# Patient Record
Sex: MALE | Race: WHITE | Employment: FULL TIME | URBAN - METROPOLITAN AREA
[De-identification: names, ages, dates, MRNs, and addresses within clinical notes are randomized per-mention and may not be internally consistent; named-entity substitution may affect disease eponyms.]

---

## 2020-07-01 ENCOUNTER — HOSPITAL ENCOUNTER (OUTPATIENT)
Age: 30
Discharge: HOME | End: 2020-07-03

## 2020-07-01 PROCEDURE — 86787 VARICELLA-ZOSTER ANTIBODY: CPT

## 2020-07-02 LAB — VARICELLA-ZOSTER VIRUS AB, IGG: NORMAL

## 2020-09-05 ENCOUNTER — EMPLOYEE WELLNESS (OUTPATIENT)
Dept: OTHER | Age: 30
End: 2020-09-05

## 2020-09-05 LAB
CHOLESTEROL, TOTAL: 123 MG/DL (ref 0–199)
GLUCOSE BLD-MCNC: 82 MG/DL (ref 74–107)
HDLC SERPL-MCNC: 32 MG/DL
LDL CHOLESTEROL CALCULATED: 80 MG/DL (ref 0–99)
TRIGL SERPL-MCNC: 57 MG/DL (ref 0–149)

## 2020-10-19 VITALS — BODY MASS INDEX: 29.26 KG/M2 | WEIGHT: 191 LBS

## 2020-12-28 ENCOUNTER — HOSPITAL ENCOUNTER (OUTPATIENT)
Dept: GENERAL RADIOLOGY | Age: 30
Discharge: HOME | End: 2020-12-30
Payer: COMMERCIAL

## 2020-12-28 ENCOUNTER — HOSPITAL ENCOUNTER (OUTPATIENT)
Age: 30
Discharge: HOME | End: 2020-12-30
Payer: COMMERCIAL

## 2020-12-28 PROCEDURE — 71101 X-RAY EXAM UNILAT RIBS/CHEST: CPT

## 2021-02-02 ENCOUNTER — OFFICE VISIT (OUTPATIENT)
Dept: CHIROPRACTIC MEDICINE | Age: 31
End: 2021-02-02
Payer: COMMERCIAL

## 2021-02-02 ENCOUNTER — TELEPHONE (OUTPATIENT)
Dept: ADMINISTRATIVE | Age: 31
End: 2021-02-02

## 2021-02-02 VITALS
TEMPERATURE: 98 F | SYSTOLIC BLOOD PRESSURE: 112 MMHG | DIASTOLIC BLOOD PRESSURE: 78 MMHG | HEIGHT: 67 IN | BODY MASS INDEX: 28.25 KG/M2 | RESPIRATION RATE: 14 BRPM | HEART RATE: 66 BPM | OXYGEN SATURATION: 97 % | WEIGHT: 180 LBS

## 2021-02-02 DIAGNOSIS — S16.1XXA STRAIN OF NECK MUSCLE, INITIAL ENCOUNTER: Primary | ICD-10-CM

## 2021-02-02 DIAGNOSIS — M54.12 CERVICAL NEURALGIA: ICD-10-CM

## 2021-02-02 DIAGNOSIS — M62.830 MUSCLE SPASM OF BACK: ICD-10-CM

## 2021-02-02 PROCEDURE — 97014 ELECTRIC STIMULATION THERAPY: CPT | Performed by: CHIROPRACTOR

## 2021-02-02 PROCEDURE — 99203 OFFICE O/P NEW LOW 30 MIN: CPT | Performed by: CHIROPRACTOR

## 2021-02-02 PROCEDURE — 98940 CHIROPRACT MANJ 1-2 REGIONS: CPT | Performed by: CHIROPRACTOR

## 2021-02-02 RX ORDER — ESCITALOPRAM OXALATE 10 MG/1
TABLET ORAL
COMMUNITY
Start: 2021-01-13

## 2021-02-02 NOTE — TELEPHONE ENCOUNTER
Pt called and requested to schedule as a new patient. He said he has had neck pain for a long time but over the past 3 days it has gotten worse. He is having neck and back pain with numbness in his left hand. Was unsure if you would want pt scheduled today or when you would want him scheduled. Please advise.

## 2021-02-02 NOTE — PROGRESS NOTES
[facility]    21  Fior Hart : 1990 Sex: male  Age: 31 y.o. Patient was referred by Ignacio Zamudio MD    Chief Complaint   Patient presents with    Neck Pain     pain and stiffness; is having numbness into the left hand. Difficulty moving head. No known injury        He states he awoke a few days ago with left greater than right neck pain identified in the cervicothoracic region. He really has not done much about it, other than take some ibuprofen this morning. He does have some symptoms down the left medial arm, with numbness and tingling in the fingertips digits 1-4. Neck Pain   This is a new problem. The current episode started in the past 7 days. The problem occurs constantly. The problem has been unchanged. The pain is associated with a sleep position. Pain location: C/T region. The pain is at a severity of 5/10 (5-7/10). The symptoms are aggravated by bending and twisting. Pertinent negatives include no fever. Associated symptoms comments: Down left arm, triceps distribution, with numbness digits 1-4 finger tips  . He has tried NSAIDs for the symptoms. The treatment provided mild relief. Neck disability index: 24/50= 48%  Red Flags:  none    Review of Systems   Constitutional: Negative for chills and fever. Musculoskeletal: Positive for neck pain and neck stiffness. Current Outpatient Medications:     escitalopram (LEXAPRO) 10 MG tablet, , Disp: , Rfl:     No Known Allergies    Past Medical History:   Diagnosis Date    Syncope      No family history on file. No past surgical history on file.   Social History     Socioeconomic History    Marital status:      Spouse name: Not on file    Number of children: Not on file    Years of education: Not on file    Highest education level: Not on file   Occupational History    Not on file   Social Needs    Financial resource strain: Not on file    Food insecurity     Worry: Not on file Cervical Compression: Reproduces axial neck pain left C6-T1 facets  Cervical Distraction: Negative  Foraminal Compression: Positive left, negative right  Maximum Cervical Rotatory Compression Testing: Positive left, negative right    No midline pain in the cervical spine. Hypertonic tender fibers cervical and thoracic paraspinal muscles. Trigger points bilateral trapezius. There is joint fixation motion screening today at: T3-5    Tashia Boss was seen today for neck pain. Diagnoses and all orders for this visit:    Strain of neck muscle, initial encounter    Cervical neuralgia    Muscle spasm of back        Treatment Plan:   I spoke with him regarding my exam findings and treatment options. Cervical capsulitis with referral pattern. Based on examination, does not appear to be true radiculopathy. However I will continue to monitor and consider imaging if he fails to respond. I recommended that we start a trial of conservative care today consisting EMS, manipulation/mobilization and home-based self-care. I will plan on seeing him 1-2 times per week for 3 weeks, then reassess to determine response to care and future options. With consent, I did begin today. Problem/Goals  Decrease pain and/or swelling and Increase ROM and/or flexibility    Today's Treatment  EMS with heat to the cervicothoracic region for 14 minutes to address muscle spasm/hypertension and alleviate pain. Diversified manipulation to listed thoracic segments. Muscle energy techniques of the cervical spinenot meeting the time requirement for care. He had some increased cervical rotation following. I want him to begin with cervical rotation exercises at homeinstructed him with these today. He has a good understanding. He will continue with OTC medications. I will follow up with him on Thursday for further care. He is receiving his second covered vaccine on Wednesday, he may cancel if he is not feeling well. I spoke to him regarding posttreatment soreness. Should any arise, he  may use ice for 10-15 minutes, over-the-counter NSAIDs or topical gels. Seen By:  Agnes Velazquez DC    * This note was created using voice recognition software.   The note was reviewed, however grammatical errors may exist.

## 2021-02-04 ENCOUNTER — OFFICE VISIT (OUTPATIENT)
Dept: CHIROPRACTIC MEDICINE | Age: 31
End: 2021-02-04
Payer: COMMERCIAL

## 2021-02-04 ENCOUNTER — OFFICE VISIT (OUTPATIENT)
Dept: FAMILY MEDICINE CLINIC | Age: 31
End: 2021-02-04
Payer: COMMERCIAL

## 2021-02-04 VITALS
RESPIRATION RATE: 16 BRPM | TEMPERATURE: 98.9 F | HEIGHT: 67 IN | HEART RATE: 95 BPM | OXYGEN SATURATION: 96 % | WEIGHT: 180 LBS | BODY MASS INDEX: 28.25 KG/M2

## 2021-02-04 VITALS
RESPIRATION RATE: 18 BRPM | OXYGEN SATURATION: 98 % | HEIGHT: 67 IN | SYSTOLIC BLOOD PRESSURE: 124 MMHG | DIASTOLIC BLOOD PRESSURE: 76 MMHG | HEART RATE: 84 BPM | BODY MASS INDEX: 29.19 KG/M2 | WEIGHT: 186 LBS | TEMPERATURE: 97.7 F

## 2021-02-04 DIAGNOSIS — M62.830 MUSCLE SPASM OF BACK: ICD-10-CM

## 2021-02-04 DIAGNOSIS — M54.12 CERVICAL NEURALGIA: ICD-10-CM

## 2021-02-04 DIAGNOSIS — M54.12 CERVICAL RADICULOPATHY: Primary | ICD-10-CM

## 2021-02-04 DIAGNOSIS — S16.1XXA STRAIN OF NECK MUSCLE, INITIAL ENCOUNTER: Primary | ICD-10-CM

## 2021-02-04 PROCEDURE — 99214 OFFICE O/P EST MOD 30 MIN: CPT | Performed by: PHYSICIAN ASSISTANT

## 2021-02-04 PROCEDURE — 97014 ELECTRIC STIMULATION THERAPY: CPT | Performed by: CHIROPRACTOR

## 2021-02-04 PROCEDURE — 96372 THER/PROPH/DIAG INJ SC/IM: CPT | Performed by: PHYSICIAN ASSISTANT

## 2021-02-04 RX ORDER — METHYLPREDNISOLONE ACETATE 80 MG/ML
60 INJECTION, SUSPENSION INTRA-ARTICULAR; INTRALESIONAL; INTRAMUSCULAR; SOFT TISSUE ONCE
Status: COMPLETED | OUTPATIENT
Start: 2021-02-04 | End: 2021-02-04

## 2021-02-04 RX ORDER — LIDOCAINE 50 MG/G
1 PATCH TOPICAL DAILY
Qty: 10 PATCH | Refills: 0 | Status: SHIPPED | OUTPATIENT
Start: 2021-02-04 | End: 2021-02-14

## 2021-02-04 RX ORDER — PREDNISONE 20 MG/1
TABLET ORAL
Qty: 18 TABLET | Refills: 0 | Status: SHIPPED | OUTPATIENT
Start: 2021-02-04

## 2021-02-04 RX ORDER — KETOROLAC TROMETHAMINE 30 MG/ML
30 INJECTION, SOLUTION INTRAMUSCULAR; INTRAVENOUS ONCE
Status: COMPLETED | OUTPATIENT
Start: 2021-02-04 | End: 2021-02-04

## 2021-02-04 RX ORDER — METHOCARBAMOL 750 MG/1
750 TABLET, FILM COATED ORAL 4 TIMES DAILY
Qty: 20 TABLET | Refills: 0 | Status: SHIPPED | OUTPATIENT
Start: 2021-02-04 | End: 2021-02-09

## 2021-02-04 RX ADMIN — KETOROLAC TROMETHAMINE 30 MG: 30 INJECTION, SOLUTION INTRAMUSCULAR; INTRAVENOUS at 11:56

## 2021-02-04 RX ADMIN — METHYLPREDNISOLONE ACETATE 60 MG: 80 INJECTION, SUSPENSION INTRA-ARTICULAR; INTRALESIONAL; INTRAMUSCULAR; SOFT TISSUE at 11:55

## 2021-02-04 NOTE — PROGRESS NOTES
21  Saida Cordial : 1990 Sex: male  Age: 31 y.o. Chief Complaint   Patient presents with    Neck Pain       HPI:   He is doing worse. Rating pain today at 9/10. Continues to have pain traveling to the left upper extremity. He has had difficulty sleeping. Not much he can do to provide relief at this point. Current Outpatient Medications:     escitalopram (LEXAPRO) 10 MG tablet, , Disp: , Rfl:     Exam:   Vitals:    21 1053   Pulse: 95   Resp: 16   Temp: 98.9 °F (37.2 °C)   SpO2: 96%       He has some midline tenderness today at C6-7 interspace. Cervical range of motion are limitedunchanged from previous. He has increased pain with cervical retraction and extension. Arm squeeze test however is negative bilaterally. Neurovascular compression testing to the right is negative. To the left mildly provokes left upper extremity symptoms. Foraminal compression testing is positive to the left, negative right. There are hypertonic and tender fibers noted today in the cervical paraspinal muscles. Sultana Montero was seen today for neck pain. Diagnoses and all orders for this visit:    Strain of neck muscle, initial encounter    Cervical neuralgia    Muscle spasm of back        Treatment Plan: Cervical spine x-rays were performed today, noting mild degenerative changes. There is also some loss of the normal lordotic curve. No fracture or dislocation is noted. I did perform EMS to the thoracic region today for 12 minutes to address pain and hypertonicity. MDT procedures were attempted but provoke symptoms. With this in mind, I advised him to see either express care or his PCP for possible medications to make him more comfortable. May need an MRI given symptoms. For convenience, he is going to see express care here today. Then, I will see him back in about 1 week for further evaluation and treatment.       Seen By:  Miguel Angel Patel DC

## 2021-02-04 NOTE — PROGRESS NOTES
21  Keaton Jeffries : 1990 Sex: male  Age 31 y.o. Subjective:  Chief Complaint   Patient presents with    Back Pain     back and neck pain for past five days          HPI:   Keaton Jeffries , 31 y.o. male presents to express care for evaluation of neck and back pain. The patient has had this pain ongoing for the last 5 days. The patient has had these symptoms for 5 days and seems to be actually worsening. The patient had been seen and evaluated by Writer today and was referred over for some pain control. Patient had x-rays obtained that show some degenerative disc disease noted. The patient states that he was relatively active as a young kid. The patient is not having any numbness but he notes that the pain goes down the left arm and seems to involve the radial type distribution. The patient states this mostly into the index and middle finger. The patient is not having any weakness of the arm. The patient is not having any weakness of the right upper extremity. The patient denies any significant headaches. Denies trauma or falls. ROS:   Unless otherwise stated in this report the patient's positive and negative responses for review of systems for constitutional, eyes, ENT, cardiovascular, respiratory, gastrointestinal, neurological, , musculoskeletal, and integument systems and related systems to the presenting problem are either stated in the history of present illness or were not pertinent or were negative for the symptoms and/or complaints related to the presenting medical problem. Positives and pertinent negatives as per HPI. All others reviewed and are negative. PMH:     Past Medical History:   Diagnosis Date    Syncope        No past surgical history on file. No family history on file.     Medications:     Current Outpatient Medications:   predniSONE (DELTASONE) 20 MG tablet, 3 tablets once daily for 3 days, 2 tablets once daily for 3 days, one tablet once daily for 3 days, Disp: 18 tablet, Rfl: 0    methocarbamol (ROBAXIN-750) 750 MG tablet, Take 1 tablet by mouth 4 times daily for 5 days, Disp: 20 tablet, Rfl: 0    lidocaine (LIDODERM) 5 %, Place 1 patch onto the skin daily for 10 days 12 hours on, 12 hours off., Disp: 10 patch, Rfl: 0    escitalopram (LEXAPRO) 10 MG tablet, , Disp: , Rfl:     Allergies:   No Known Allergies    Social History:     Social History     Tobacco Use    Smoking status: Former Smoker    Smokeless tobacco: Never Used   Substance Use Topics    Alcohol use: No    Drug use: No       Patient lives at home. Physical Exam:     Vitals:    02/04/21 1141   BP: 124/76   Pulse: 84   Resp: 18   Temp: 97.7 °F (36.5 °C)   SpO2: 98%   Weight: 186 lb (84.4 kg)   Height: 5' 7\" (1.702 m)       Exam:  Physical Exam  Nurses note and vital signs reviewed and patient is not hypoxic. General: The patient appears well and in no apparent distress. Patient is resting comfortably on cart. Skin: Warm, dry, no pallor noted. There is no rash noted. Head: Normocephalic, atraumatic. Eye: Normal conjunctiva  Ears, Nose, Mouth, and Throat: Oral mucosa is moist  Neck: The patient has reproducible pain to the left paracervical musculature into the left side of the neck. The patient had some minimal tenderness noted adjacent to the C5, C6 and C7 area. Also minimally to the T1-T2 area. There is no step-offs or crepitus no midline tenderness. The patient did have range of motion difficulties with flexion, extension, left and right lateral rotation. There was no palpable mass. No significant lymphadenopathy  Cardiovascular: Regular Rate  Respiratory: Patient is in no distress, no accessory muscle use  Back: Non-tender, no CVA tenderness bilaterally to percussion. GI: Normal bowel sounds, no tenderness to palpation, no masses appreciated. No rebound, guarding, or rigidity noted. Musculoskeletal: The patient has no evidence of calf tenderness, no pitting edema, symmetrical pulses noted bilaterally  Neurological: A&O x4, normal speech      Testing:     Xr Cervical Spine (4-5 Views)    Result Date: 2/4/2021  EXAMINATION: 4 XRAY VIEWS OF THE CERVICAL SPINE 2/4/2021 9:59 am COMPARISON: C-spine CT dated November 10, 2006 HISTORY: ORDERING SYSTEM PROVIDED HISTORY: Strain of neck muscle, initial encounter TECHNOLOGIST PROVIDED HISTORY: Reason for exam:->neck pain to L UE; no trauma FINDINGS: No fracture or dislocation. Degenerative disc disease is mild being most apparent C4 to C7. Normal soft tissues. Mild degenerative disc disease. Medical Decision Making:     Vital signs reviewed    Past medical history reviewed. Allergies reviewed. Medications reviewed. Patient on arrival does not appear to be in any apparent distress or discomfort. The patient will be treated with intramuscular injection of methylprednisone and ketorolac. The patient will be given a prescription for Robaxin, Lidoderm patch and prednisone. The patient will be set up for a MRI of the cervical spine. We will see if this does not help improve the symptoms with the medications and she may hold off on the MRI. May have herniated disc that is involving the C5, C6 and C7 area. Discussed this with the patient extensively. He will follow-up with chiropractor. Follow-up with PCP. Clinical Impression:   Tashia Boss was seen today for back pain. Diagnoses and all orders for this visit:    Cervical radiculopathy  -     MRI CERVICAL SPINE WO CONTRAST;  Future    Other orders  -     methylPREDNISolone acetate (DEPO-MEDROL) injection 60 mg  -     ketorolac (TORADOL) injection 30 mg -     predniSONE (DELTASONE) 20 MG tablet; 3 tablets once daily for 3 days, 2 tablets once daily for 3 days, one tablet once daily for 3 days  -     methocarbamol (ROBAXIN-750) 750 MG tablet; Take 1 tablet by mouth 4 times daily for 5 days  -     lidocaine (LIDODERM) 5 %; Place 1 patch onto the skin daily for 10 days 12 hours on, 12 hours off. The patient is to call for any concerns or return if any of the signs or symptoms worsen. The patient is to follow-up with PCP in the next 2-3 days for repeat evaluation repeat assessment or go directly to the emergency department.      SIGNATURE: Roby Caldwell III, PA-C

## 2021-02-11 ENCOUNTER — OFFICE VISIT (OUTPATIENT)
Dept: CHIROPRACTIC MEDICINE | Age: 31
End: 2021-02-11
Payer: COMMERCIAL

## 2021-02-11 VITALS
HEART RATE: 80 BPM | TEMPERATURE: 98.3 F | RESPIRATION RATE: 16 BRPM | OXYGEN SATURATION: 97 % | BODY MASS INDEX: 29.19 KG/M2 | HEIGHT: 67 IN | WEIGHT: 186 LBS

## 2021-02-11 DIAGNOSIS — M54.12 CERVICAL NEURALGIA: ICD-10-CM

## 2021-02-11 DIAGNOSIS — S16.1XXA STRAIN OF NECK MUSCLE, INITIAL ENCOUNTER: Primary | ICD-10-CM

## 2021-02-11 DIAGNOSIS — M62.830 MUSCLE SPASM OF BACK: ICD-10-CM

## 2021-02-11 PROCEDURE — 97014 ELECTRIC STIMULATION THERAPY: CPT | Performed by: CHIROPRACTOR

## 2021-02-11 PROCEDURE — 99212 OFFICE O/P EST SF 10 MIN: CPT | Performed by: CHIROPRACTOR

## 2021-02-11 NOTE — PROGRESS NOTES
21  Mickey Carlisle : 1990 Sex: male  Age: 31 y.o. Chief Complaint   Patient presents with    Neck Pain     Neck is doing somewhat better. Pt will be rescheduling MRI. HPI:   Pain is has improved. On average, pain is perceived as mild (1-3  pain scale). He still has pain in certain positions. He unfortunately had to cancel his MRI appointment. He has no new issues for me today, continues with left greater than right neck pain traveling to the left upper extremity. Current Outpatient Medications:     predniSONE (DELTASONE) 20 MG tablet, 3 tablets once daily for 3 days, 2 tablets once daily for 3 days, one tablet once daily for 3 days, Disp: 18 tablet, Rfl: 0    lidocaine (LIDODERM) 5 %, Place 1 patch onto the skin daily for 10 days 12 hours on, 12 hours off., Disp: 10 patch, Rfl: 0    escitalopram (LEXAPRO) 10 MG tablet, , Disp: , Rfl:     Exam:   Vitals:    21 1111   Pulse: 80   Resp: 16   Temp: 98.3 °F (36.8 °C)   SpO2: 97%       Reflexes are +2 and symmetrical throughout the upper extremities. Sensation light touch WNL to the extremity dermatomes. Radial pulses 2/4. Capillary refill brisk at the fingers. Mario's and Tromner's are absent. Strength is 5/5 to the upper extremity indicator muscles. There is a mild loss cervical active range of motion throughout. Left rotation and left lateral flexion both increased left upper extremity symptoms. Chin protraction and retraction both increase left upper extremity symptoms. Right lateral flexion reproduces left upper extremity symptoms. Right lateral flexion x10decrease during loading/better after loading    Cervical compression, distraction both provoke cervical symptoms. Left foraminal compression and left maximum cervical rotatory compression testing both provoke left upper extremity symptoms.   Unremarkable right There are hypertonic and tender fibers noted today in the bilateral cervical and thoracic paraspinal muscles. Mild midline pain C5-6 interspace, C6-7 interspace. Jovanni De Guzman was seen today for neck pain. Diagnoses and all orders for this visit:    Strain of neck muscle, initial encounter    Cervical neuralgia    Muscle spasm of back        Treatment Plan:  EMS with heat to the cervicothoracic region for 15 minutes to address muscle spasm/hypertension and alleviate pain. Reviewed MDT principles for home. At this point I want him doing 8-10 right lateral flexion, approximately every other hour, with the understanding that should the left upper extremity symptoms worsen he needs to stop. Advised him to reschedule the MRI. I suspect left paracentral disc herniation C5-6. Follow-up next week.         Seen By:  Aminta Odonnell DC

## 2021-02-19 ENCOUNTER — TELEPHONE (OUTPATIENT)
Dept: FAMILY MEDICINE CLINIC | Age: 31
End: 2021-02-19

## 2021-02-19 NOTE — TELEPHONE ENCOUNTER
Called to appeal the denial. Was unable to to get the denial approved. I spoke to the patient and he was aware of the denial. Advised to contact PCP for further work up and evaluation. He was agreeable and understands this plan at this time.

## 2021-02-19 NOTE — TELEPHONE ENCOUNTER
The pt came through [facility] 2/4 because he was having back pain. You ordered an MRI cervical spine wo contrast, but  is calling to let you know that his insurance denied it and is requiring a peer to peer.  I tried to call the person back to get info but I couldn't get through

## 2021-06-18 LAB
CHOLESTEROL, TOTAL: 143 MG/DL (ref 0–199)
GLUCOSE BLD-MCNC: 91 MG/DL (ref 74–107)
HDLC SERPL-MCNC: 31 MG/DL
LDL CHOLESTEROL CALCULATED: 94 MG/DL (ref 0–99)
TRIGL SERPL-MCNC: 89 MG/DL (ref 0–149)

## 2022-05-03 LAB
CHOLESTEROL, TOTAL: 134 MG/DL (ref 0–199)
GLUCOSE BLD-MCNC: 92 MG/DL (ref 74–107)
HDLC SERPL-MCNC: 33 MG/DL
LDL CHOLESTEROL CALCULATED: 89 MG/DL (ref 0–99)
TRIGL SERPL-MCNC: 62 MG/DL (ref 0–149)

## 2024-06-11 ENCOUNTER — HOSPITAL ENCOUNTER (EMERGENCY)
Age: 34
Discharge: HOME | End: 2024-06-11
Attending: EMERGENCY MEDICINE
Payer: COMMERCIAL

## 2024-06-11 ENCOUNTER — APPOINTMENT (OUTPATIENT)
Dept: GENERAL RADIOLOGY | Age: 34
End: 2024-06-11
Payer: COMMERCIAL

## 2024-06-11 VITALS
HEART RATE: 85 BPM | HEIGHT: 67 IN | RESPIRATION RATE: 16 BRPM | WEIGHT: 165 LBS | BODY MASS INDEX: 25.9 KG/M2 | OXYGEN SATURATION: 99 % | TEMPERATURE: 97.1 F | SYSTOLIC BLOOD PRESSURE: 129 MMHG | DIASTOLIC BLOOD PRESSURE: 75 MMHG

## 2024-06-11 DIAGNOSIS — R07.9 CHEST PAIN, UNSPECIFIED TYPE: Primary | ICD-10-CM

## 2024-06-11 LAB
AMPHET UR QL SCN: NEGATIVE
ANION GAP SERPL CALCULATED.3IONS-SCNC: 16 MMOL/L (ref 7–16)
APAP SERPL-MCNC: <5 UG/ML (ref 10–30)
BARBITURATES UR QL SCN: NEGATIVE
BASOPHILS # BLD: 0.07 K/UL (ref 0–0.2)
BASOPHILS NFR BLD: 1 % (ref 0–2)
BENZODIAZ UR QL: NEGATIVE
BUN SERPL-MCNC: 11 MG/DL (ref 6–20)
BUPRENORPHINE UR QL: NEGATIVE
CALCIUM SERPL-MCNC: 10 MG/DL (ref 8.6–10.2)
CANNABINOIDS UR QL SCN: POSITIVE
CHLORIDE SERPL-SCNC: 101 MMOL/L (ref 98–107)
CO2 SERPL-SCNC: 22 MMOL/L (ref 22–29)
COCAINE UR QL SCN: NEGATIVE
CREAT SERPL-MCNC: 0.9 MG/DL (ref 0.7–1.2)
EOSINOPHIL # BLD: 0.01 K/UL (ref 0.05–0.5)
EOSINOPHILS RELATIVE PERCENT: 0 % (ref 0–6)
ERYTHROCYTE [DISTWIDTH] IN BLOOD BY AUTOMATED COUNT: 12.9 % (ref 11.5–15)
ETHANOLAMINE SERPL-MCNC: <10 MG/DL (ref 0–0.08)
FENTANYL UR QL: NEGATIVE
GFR, ESTIMATED: >90 ML/MIN/1.73M2
GLUCOSE SERPL-MCNC: 109 MG/DL (ref 74–99)
HCT VFR BLD AUTO: 46 % (ref 37–54)
HGB BLD-MCNC: 15.6 G/DL (ref 12.5–16.5)
IMM GRANULOCYTES # BLD AUTO: 0.03 K/UL (ref 0–0.58)
IMM GRANULOCYTES NFR BLD: 0 % (ref 0–5)
LYMPHOCYTES NFR BLD: 1.64 K/UL (ref 1.5–4)
LYMPHOCYTES RELATIVE PERCENT: 18 % (ref 20–42)
MAGNESIUM SERPL-MCNC: 1.9 MG/DL (ref 1.6–2.6)
MCH RBC QN AUTO: 28.7 PG (ref 26–35)
MCHC RBC AUTO-ENTMCNC: 33.9 G/DL (ref 32–34.5)
MCV RBC AUTO: 84.7 FL (ref 80–99.9)
METHADONE UR QL: NEGATIVE
MONOCYTES NFR BLD: 0.63 K/UL (ref 0.1–0.95)
MONOCYTES NFR BLD: 7 % (ref 2–12)
NEUTROPHILS NFR BLD: 74 % (ref 43–80)
NEUTS SEG NFR BLD: 6.94 K/UL (ref 1.8–7.3)
OPIATES UR QL SCN: NEGATIVE
OXYCODONE UR QL SCN: NEGATIVE
PCP UR QL SCN: NEGATIVE
PLATELET # BLD AUTO: 305 K/UL (ref 130–450)
PMV BLD AUTO: 10.9 FL (ref 7–12)
POTASSIUM SERPL-SCNC: 3.9 MMOL/L (ref 3.5–5)
RBC # BLD AUTO: 5.43 M/UL (ref 3.8–5.8)
SALICYLATES SERPL-MCNC: <0.3 MG/DL (ref 0–30)
SODIUM SERPL-SCNC: 139 MMOL/L (ref 132–146)
TEST INFORMATION: ABNORMAL
TOXIC TRICYCLIC SC,BLOOD: NEGATIVE
TROPONIN I SERPL HS-MCNC: <6 NG/L (ref 0–11)
TSH SERPL DL<=0.05 MIU/L-ACNC: 1.19 UIU/ML (ref 0.27–4.2)
WBC OTHER # BLD: 9.3 K/UL (ref 4.5–11.5)

## 2024-06-11 PROCEDURE — 83735 ASSAY OF MAGNESIUM: CPT

## 2024-06-11 PROCEDURE — 80143 DRUG ASSAY ACETAMINOPHEN: CPT

## 2024-06-11 PROCEDURE — 71046 X-RAY EXAM CHEST 2 VIEWS: CPT

## 2024-06-11 PROCEDURE — 93005 ELECTROCARDIOGRAM TRACING: CPT

## 2024-06-11 PROCEDURE — 99285 EMERGENCY DEPT VISIT HI MDM: CPT

## 2024-06-11 PROCEDURE — 85025 COMPLETE CBC W/AUTO DIFF WBC: CPT

## 2024-06-11 PROCEDURE — 84484 ASSAY OF TROPONIN QUANT: CPT

## 2024-06-11 PROCEDURE — 6360000002 HC RX W HCPCS: Performed by: EMERGENCY MEDICINE

## 2024-06-11 PROCEDURE — 80179 DRUG ASSAY SALICYLATE: CPT

## 2024-06-11 PROCEDURE — 96372 THER/PROPH/DIAG INJ SC/IM: CPT

## 2024-06-11 PROCEDURE — 80048 BASIC METABOLIC PNL TOTAL CA: CPT

## 2024-06-11 PROCEDURE — 80307 DRUG TEST PRSMV CHEM ANLYZR: CPT

## 2024-06-11 PROCEDURE — 84443 ASSAY THYROID STIM HORMONE: CPT

## 2024-06-11 PROCEDURE — G0480 DRUG TEST DEF 1-7 CLASSES: HCPCS

## 2024-06-11 RX ORDER — HYDROXYZINE HYDROCHLORIDE 25 MG/1
25 TABLET, FILM COATED ORAL EVERY 8 HOURS PRN
Qty: 30 TABLET | Refills: 0 | Status: SHIPPED | OUTPATIENT
Start: 2024-06-11 | End: 2024-06-21

## 2024-06-11 RX ORDER — HYDROXYZINE HYDROCHLORIDE 50 MG/ML
50 INJECTION, SOLUTION INTRAMUSCULAR ONCE
Status: COMPLETED | OUTPATIENT
Start: 2024-06-11 | End: 2024-06-11

## 2024-06-11 RX ORDER — HYDROXYZINE HYDROCHLORIDE 25 MG/1
25 TABLET, FILM COATED ORAL EVERY 8 HOURS PRN
Qty: 30 TABLET | Refills: 0 | Status: SHIPPED | OUTPATIENT
Start: 2024-06-11 | End: 2024-06-11

## 2024-06-11 RX ADMIN — HYDROXYZINE HYDROCHLORIDE 50 MG: 50 INJECTION, SOLUTION INTRAMUSCULAR at 21:32

## 2024-06-11 ASSESSMENT — PAIN DESCRIPTION - DESCRIPTORS: DESCRIPTORS: ACHING

## 2024-06-11 ASSESSMENT — PAIN DESCRIPTION - LOCATION: LOCATION: CHEST

## 2024-06-11 ASSESSMENT — LIFESTYLE VARIABLES
HOW OFTEN DO YOU HAVE A DRINK CONTAINING ALCOHOL: NEVER
HOW MANY STANDARD DRINKS CONTAINING ALCOHOL DO YOU HAVE ON A TYPICAL DAY: PATIENT DOES NOT DRINK

## 2024-06-11 ASSESSMENT — PAIN DESCRIPTION - FREQUENCY: FREQUENCY: CONTINUOUS

## 2024-06-11 ASSESSMENT — PAIN DESCRIPTION - PAIN TYPE: TYPE: ACUTE PAIN

## 2024-06-11 ASSESSMENT — PAIN SCALES - GENERAL: PAINLEVEL_OUTOF10: 2

## 2024-06-11 NOTE — ED PROVIDER NOTES
Department of Emergency Medicine  FIRST PROVIDER TRIAGE NOTE             Independent MLP           6/11/24  7:24 PM EDT    Date of Encounter: 6/11/24   MRN: [id number]      HPI: Isaac Oliveira is a 33 y.o. male who presents to the ED for evaluation due to increased anxiety over the last 5 days.  He does have a history of anxiety and was started on Lexapro.  Complains of chest tightness, feeling like it is hard to get a deep breath, feeling of impending doom, dizziness, nausea, vomiting, diarrhea, and urinary urgency and frequency.      ROS: Negative for abd pain, back pain, fever, or headache.    PE: Gen Appearance/Constitutional: alert  CV: regular rate  Pulm: CTA bilat  GI: soft and NT     Initial Plan of Care: All treatment areas with department are currently occupied. Plan to order/Initiate the following while awaiting opening in ED: labs, EKG, and imaging studies.  Initiate Treatment-Testing, Proceed toTreatment Area When Bed Available for ED Attending/MLP to Continue Care    Electronically signed by ELIZABETH Nance CNP   DD: 6/11/24       Yohana Singh APRN - CNP  06/11/24 1926

## 2024-06-11 NOTE — ED TRIAGE NOTES
panic attack and anxiety x several days- placed on lexapro- dr nieto ordered today  Trigger was Friday say a friends obituary

## 2024-06-12 NOTE — DISCHARGE INSTRUCTIONS
Take the prescribed Atarax as needed for further anxiety.  Follow your primary physician as needed.  As we discussed, last 6 to 8 weeks for Lexapro to begin working.

## 2024-06-12 NOTE — ED PROVIDER NOTES
[facility] EMERGENCY DEPARTMENT  EMERGENCY DEPARTMENT ENCOUNTER        Pt Name: Isaac Oliveira  MRN: [id number]  Birthdate 1990  Date of evaluation: 6/11/2024  Provider: Dorothy Black MD  Attending Provider:   He is comfortable with outpatient follow-up.  The time of discharge, the patient was hemodynamically stable.No att. providers found  PCP: Facundo Nieto MD  Note Started: 9:10 PM EDT 6/11/24    CHIEF COMPLAINT       Chief Complaint   Patient presents with    Anxiety     panic attack and anxiety x several days- placed on lexapro- dr nieto ordered today  Trigger was Friday say a friends obituary    Chest Pain     Began saturday    Shortness of Breath       HISTORY OF PRESENT ILLNESS: 1 or more Elements   History From: The patient        Isaac Oliveira is a 33 y.o. male with a past medical history of syncope in the past presenting with anxiety and chest pain.  Patient states that since that day, he has been having intermittent episodes of chest discomfort and palpitations.  He reports substernal chest discomfort that is nonradiating.  Not related to exertion.  No other associated symptoms.  He reports that it is likely anxiety.  He has had anxiety attacks in the past and it feels very similar.  He followed up with his primary care physician who recently started him on Lexapro.  He states that once in the office that he saw a friend's obituary in the paper and he started feeling very anxious after that.    Nursing Notes were all reviewed and agreed with or any disagreements were addressed in the HPI.      REVIEW OF EXTERNAL NOTE :       2/11/21: Chiropractor office visit      Diagnoses and all orders for this visit:     Strain of neck muscle, initial encounter     Cervical neuralgia     Muscle spasm of back           Treatment Plan:  EMS with heat to the cervicothoracic region for 15 minutes to address muscle spasm/hypertension and alleviate pain.  Reviewed MDT

## 2024-06-13 LAB
EKG ATRIAL RATE: 74 BPM
EKG P AXIS: 12 DEGREES
EKG P-R INTERVAL: 132 MS
EKG Q-T INTERVAL: 398 MS
EKG QRS DURATION: 102 MS
EKG QTC CALCULATION (BAZETT): 441 MS
EKG R AXIS: -9 DEGREES
EKG T AXIS: -13 DEGREES
EKG VENTRICULAR RATE: 74 BPM

## 2024-06-13 PROCEDURE — 93010 ELECTROCARDIOGRAM REPORT: CPT | Performed by: INTERNAL MEDICINE
